# Patient Record
Sex: MALE | Race: BLACK OR AFRICAN AMERICAN | Employment: OTHER | URBAN - METROPOLITAN AREA
[De-identification: names, ages, dates, MRNs, and addresses within clinical notes are randomized per-mention and may not be internally consistent; named-entity substitution may affect disease eponyms.]

---

## 2018-10-18 ENCOUNTER — HOSPITAL ENCOUNTER (OUTPATIENT)
Dept: WOUND CARE | Age: 63
Discharge: HOME | End: 2018-10-18
Payer: OTHER GOVERNMENT

## 2018-10-18 VITALS
WEIGHT: 144 LBS | RESPIRATION RATE: 16 BRPM | DIASTOLIC BLOOD PRESSURE: 88 MMHG | TEMPERATURE: 98 F | SYSTOLIC BLOOD PRESSURE: 130 MMHG | HEART RATE: 74 BPM | HEIGHT: 68 IN | BODY MASS INDEX: 21.82 KG/M2

## 2018-10-18 DIAGNOSIS — Z93.1 GASTROSTOMY IN PLACE (HCC): ICD-10-CM

## 2018-10-18 DIAGNOSIS — M87.30 OSTEORADIONECROSIS (HCC): ICD-10-CM

## 2018-10-18 DIAGNOSIS — K08.89 NON-RESTORABLE TOOTH: ICD-10-CM

## 2018-10-18 DIAGNOSIS — K02.9 DENTAL CARIES: ICD-10-CM

## 2018-10-18 DIAGNOSIS — H61.23 BILATERAL IMPACTED CERUMEN: ICD-10-CM

## 2018-10-18 DIAGNOSIS — R63.4 WEIGHT LOSS, UNINTENTIONAL: ICD-10-CM
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
## 2018-10-18 DIAGNOSIS — E03.9 ACQUIRED HYPOTHYROIDISM: ICD-10-CM

## 2018-10-18 DIAGNOSIS — T66.XXXS LATE EFFECT OF RADIATION: ICD-10-CM

## 2018-10-18 PROCEDURE — 99202 OFFICE O/P NEW SF 15 MIN: CPT

## 2018-10-18 PROCEDURE — 99204 OFFICE O/P NEW MOD 45 MIN: CPT | Performed by: INTERNAL MEDICINE

## 2018-10-18 RX ORDER — ACETAMINOPHEN 160 MG
TABLET,DISINTEGRATING ORAL DAILY
COMMUNITY

## 2018-10-18 RX ORDER — METOPROLOL SUCCINATE 25 MG/1
25 TABLET, EXTENDED RELEASE ORAL DAILY
COMMUNITY

## 2018-10-18 NOTE — PLAN OF CARE
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 He is taken to HBO suite and educated about the treatments per HBO nurse,  Dr. Anna Lara will obtain additional required information and contact patient 10/22/18.

## 2018-10-23 PROBLEM — R13.10 DYSPHAGIA: Status: ACTIVE | Noted: 2018-10-23

## 2018-10-23 PROBLEM — Z93.1 GASTROSTOMY IN PLACE (HCC): Status: ACTIVE | Noted: 2018-10-23

## 2018-10-23 PROBLEM — E78.2 MIXED HYPERLIPIDEMIA: Status: ACTIVE | Noted: 2018-10-23

## 2018-10-23 PROBLEM — K08.89 NON-RESTORABLE TOOTH: Status: ACTIVE | Noted: 2018-10-23

## 2018-10-23 PROBLEM — H61.23 BILATERAL IMPACTED CERUMEN: Status: ACTIVE | Noted: 2018-10-23
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
## 2018-10-23 PROBLEM — K02.9 DENTAL CARIES: Status: ACTIVE | Noted: 2018-10-23

## 2018-10-23 PROBLEM — M87.30 OSTEORADIONECROSIS (HCC): Status: ACTIVE | Noted: 2018-10-23

## 2018-10-23 PROBLEM — T66.XXXS LATE EFFECT OF RADIATION: Status: ACTIVE | Noted: 2018-10-23

## 2018-10-23 PROBLEM — R63.4 WEIGHT LOSS, UNINTENTIONAL: Status: ACTIVE | Noted: 2018-10-23

## 2018-10-23 PROBLEM — E03.9 HYPOTHYROIDISM: Status: ACTIVE | Noted: 2018-10-23

## 2018-10-23 NOTE — CONSULTS
medical history of Head and neck cancer ([facility] [address] and Non-STEMI (non-ST elevated myocardial infarction) ([facility] [address] He has a past surgical history that includes Coronary artery bypass graft (03/2015) and Pharyngeal Biopsy (Left, 2015). His family history includes Heart Disease in his father and mother. Mr. Branden Lara reports that he has never smoked. He has never used smokeless tobacco. He reports that he does not drink alcohol or use drugs. His current medication list consists of Levothyroxine Sodium, Pembrolizumab, SENIOR MULTIVITAMIN PLUS, Vitamin D3, metoprolol succinate, and vitamin D. Allergies: Patient has no known allergies. Pertinent items from the review of systems are discussed in the HPI; the remainder of the ROS was reviewed and is negative. Objective:     Vitals:    10/18/18 1300   BP: 130/88   Pulse: 74   Resp: 16   Temp: 98 °F (36.7 °C)   TempSrc: Oral   Weight: 144 lb (65.3 kg)   Height: 5' 8\" (1.727 m)       Constitutional:  well-developed, thin but not cachectic, fatigued, chronically ill appearing, NAD  Psychiatric:  oriented to person, place and time; mood and affect appropriate for the situation (a bit depressed)  Eyes:  pupils equal, round and reactive to light; sclerae anicteric, conjunctivae not pale  ENT: no thrush or oral ulcers; tooth #2 discolored, carious, a bit loose, tender, with swollen gingiva around it; TMs both obscured by impacted cerumen  Lungs: clear to auscultation B/L  Heart: regular, no murmur, no gallop  Lymphatic:  no cervical or para-auricular adenopathy  _____________________________    CXR (4/12/16) -- prior sternotomy, minimal left sided pleural effusion    TTE (10/19/16) -- estimated EF 50-55%    EKG (5/12/17) -- performed at [facility], but I'm unable to access records in Epic. Most recent labs in July 2016 with albumin 3.7. (No records sent yet from the [place]).     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 Factors contributing to occurrence and/or persistence of the chronic ulcer include really only his prior radiation therapy, and any possible malnutrition and immunosuppression associated with the presence of his cancer; his current immunotherapy isn't immunosuppressive itself, just targets PCD receptors on his lymphocytes, enabling them to better attack his cancer cells. I believe HBOT is indicated as an important adjunctive therapy in this case because of Mr. River's condition, to speed healing and to decrease the chance of serious complications including chronic bone exposure, osteomyelitis and loss of bone. Specifically, the goals of HBOT in this case are to reverse the hypoxia in the maxillary and adjacent soft tissues by decreasing edema and enhancing angiogenesis, to repair injured tissue. Due to the potential adverse effects of HBO therapy, I reviewed some particular aspects of Mr. River's medical history.  There is no history of idiopathic epilepsy, secondary seizures, stroke, CNS surgery or bleeding, recent head trauma, frequent hypoglycemia, untreated cataracts, optic neuritis, bleomycin-associated pulmonary fibrosis or recent bleomycin use, recent pulmonary lobectomy or pneumonectomy, obstructive or

## 2018-11-26 ENCOUNTER — HOSPITAL ENCOUNTER (OUTPATIENT)
Dept: HYPERBARIC MEDICINE | Age: 63
Discharge: HOME | End: 2018-11-26
Payer: OTHER GOVERNMENT

## 2018-11-26 VITALS
DIASTOLIC BLOOD PRESSURE: 71 MMHG | BODY MASS INDEX: 21.82 KG/M2 | HEART RATE: 76 BPM | TEMPERATURE: 98.1 F | WEIGHT: 144 LBS | RESPIRATION RATE: 16 BRPM | SYSTOLIC BLOOD PRESSURE: 124 MMHG | HEIGHT: 68 IN

## 2018-11-26 PROCEDURE — 99212 OFFICE O/P EST SF 10 MIN: CPT

## 2018-11-28 ENCOUNTER — HOSPITAL ENCOUNTER (OUTPATIENT)
Dept: HYPERBARIC MEDICINE | Age: 63
Discharge: HOME | End: 2018-11-28
Payer: OTHER GOVERNMENT

## 2018-11-28 VITALS
SYSTOLIC BLOOD PRESSURE: 134 MMHG | RESPIRATION RATE: 16 BRPM | HEART RATE: 66 BPM | TEMPERATURE: 98.4 F | DIASTOLIC BLOOD PRESSURE: 76 MMHG

## 2018-11-28 PROCEDURE — G0277 HBOT, FULL BODY CHAMBER, 30M: HCPCS

## 2018-11-28 PROCEDURE — 99183 HYPERBARIC OXYGEN THERAPY: CPT | Performed by: EMERGENCY MEDICINE

## 2018-11-29 ENCOUNTER — HOSPITAL ENCOUNTER (OUTPATIENT)
Dept: HYPERBARIC MEDICINE | Age: 63
Discharge: HOME | End: 2018-11-29
Payer: OTHER GOVERNMENT

## 2018-11-29 VITALS
RESPIRATION RATE: 16 BRPM | HEART RATE: 59 BPM | DIASTOLIC BLOOD PRESSURE: 84 MMHG | TEMPERATURE: 97.7 F | SYSTOLIC BLOOD PRESSURE: 145 MMHG

## 2018-11-29 PROCEDURE — G0277 HBOT, FULL BODY CHAMBER, 30M: HCPCS

## 2018-11-30 ENCOUNTER — HOSPITAL ENCOUNTER (OUTPATIENT)
Dept: HYPERBARIC MEDICINE | Age: 63
Discharge: HOME | End: 2018-11-30
Payer: OTHER GOVERNMENT

## 2018-11-30 VITALS
RESPIRATION RATE: 16 BRPM | HEART RATE: 60 BPM | DIASTOLIC BLOOD PRESSURE: 76 MMHG | SYSTOLIC BLOOD PRESSURE: 123 MMHG | TEMPERATURE: 98 F

## 2018-11-30 PROCEDURE — 99183 HYPERBARIC OXYGEN THERAPY: CPT | Performed by: INTERNAL MEDICINE

## 2018-11-30 PROCEDURE — G0277 HBOT, FULL BODY CHAMBER, 30M: HCPCS

## 2018-11-30 NOTE — PLAN OF CARE
REVIEWED: yes   Smoking has a negative effect on treatment and may diminish the benefits you may receive. Smoking within 2 hours prior to your treatment poses additional risk and should be avoided completely. Drinking alcohol or using illicit drugs may also have a negative effect on your treatment and should be avoided. Drinking carbonated beverages such as soda pop within 1 hour of your treatment could cause pains in the stomach during the treatment. Caffeinated beverages or foods containing caffeine should be avoided before your treatment. Please let us know if we can assist you with seeking help to stop smoking, drinking or using recreational drugs. PROHIBITED ITEMS INFORMATION REVIEWED: yes   No wigs, hair spray, hair oils, hair ornaments, creams, lotions, make-up, after shave, perfumes, colognes, chap stick, lip balms, mustache waxes, petroleum products (e.g. Vaseline), baby oil, deodorant or ointments  No nail polish    No synthetic clothing  No nylon hose, underwear, panty hose or bra  No food, gum or candy  No jewelry  No smoking materials such as lighter, cigarettes or matches  No reading material  No hearing aids, non-fixed dentures  No Hard (non-gas permeable) contact lenses  Most dressings are approved to wear into the hyperbaric chamber. Please advise the hyperbaric staff of any new dressings applied to your wound to ensure the new dressings are compatible with hyperbaric oxygen treatments. No alcohol preps  No heat packs  No street clothes or shoes  No cell phones or other electronics  If it was not a part of you when you were born into this world, if it was not provided by the chamber , then it cannot go into the chamber with you.         Electronically signed by Julio César Weaver RN on 11/26/2018 at 7:39 AM

## 2018-12-03 ENCOUNTER — HOSPITAL ENCOUNTER (OUTPATIENT)
Dept: HYPERBARIC MEDICINE | Age: 63
Discharge: HOME | End: 2018-12-03
Payer: OTHER GOVERNMENT

## 2018-12-03 VITALS
RESPIRATION RATE: 16 BRPM | TEMPERATURE: 97.9 F | SYSTOLIC BLOOD PRESSURE: 138 MMHG | WEIGHT: 144 LBS | DIASTOLIC BLOOD PRESSURE: 80 MMHG | HEART RATE: 65 BPM | BODY MASS INDEX: 21.82 KG/M2 | HEIGHT: 68 IN

## 2018-12-03 PROCEDURE — 99183 HYPERBARIC OXYGEN THERAPY: CPT | Performed by: INTERNAL MEDICINE

## 2018-12-03 PROCEDURE — G0277 HBOT, FULL BODY CHAMBER, 30M: HCPCS

## 2018-12-04 ENCOUNTER — HOSPITAL ENCOUNTER (OUTPATIENT)
Dept: HYPERBARIC MEDICINE | Age: 63
Discharge: HOME | End: 2018-12-04
Payer: OTHER GOVERNMENT

## 2018-12-04 VITALS
HEART RATE: 66 BPM | DIASTOLIC BLOOD PRESSURE: 77 MMHG | SYSTOLIC BLOOD PRESSURE: 118 MMHG | HEIGHT: 68 IN | WEIGHT: 144 LBS | BODY MASS INDEX: 21.82 KG/M2 | RESPIRATION RATE: 16 BRPM | TEMPERATURE: 97.9 F

## 2018-12-04 PROCEDURE — G0277 HBOT, FULL BODY CHAMBER, 30M: HCPCS

## 2018-12-04 PROCEDURE — 99183 HYPERBARIC OXYGEN THERAPY: CPT | Performed by: INTERNAL MEDICINE

## 2018-12-05 ENCOUNTER — HOSPITAL ENCOUNTER (OUTPATIENT)
Dept: HYPERBARIC MEDICINE | Age: 63
Discharge: HOME | End: 2018-12-05
Payer: OTHER GOVERNMENT

## 2018-12-05 VITALS
DIASTOLIC BLOOD PRESSURE: 72 MMHG | TEMPERATURE: 97.9 F | HEART RATE: 61 BPM | RESPIRATION RATE: 16 BRPM | SYSTOLIC BLOOD PRESSURE: 122 MMHG

## 2018-12-05 PROCEDURE — 99183 HYPERBARIC OXYGEN THERAPY: CPT | Performed by: INTERNAL MEDICINE

## 2018-12-05 PROCEDURE — G0277 HBOT, FULL BODY CHAMBER, 30M: HCPCS

## 2018-12-07 ENCOUNTER — HOSPITAL ENCOUNTER (OUTPATIENT)
Dept: HYPERBARIC MEDICINE | Age: 63
Discharge: HOME | End: 2018-12-07
Payer: OTHER GOVERNMENT

## 2018-12-07 VITALS
HEART RATE: 63 BPM | DIASTOLIC BLOOD PRESSURE: 78 MMHG | TEMPERATURE: 98 F | RESPIRATION RATE: 16 BRPM | SYSTOLIC BLOOD PRESSURE: 124 MMHG

## 2018-12-07 PROCEDURE — 99183 HYPERBARIC OXYGEN THERAPY: CPT | Performed by: INTERNAL MEDICINE

## 2018-12-07 PROCEDURE — G0277 HBOT, FULL BODY CHAMBER, 30M: HCPCS

## 2018-12-07 NOTE — PLAN OF CARE
Problem: Physical Regulation:  Intervention: Assess knowledge and expectations of HBO therapy  Patient seen for HBOT treatment 7 / 20    today. Patient assessment WNL and cleared for HBOT. Patient was compressed in HBO chamber to 2.5 SHANELL at 2.0 psi/min x 90 min treatment w/ 2 five air breaks @30 min intervals. Patient is tolerating treatment well and is currently resting comfortably and watching television. HBO RN at chamber side, will continue to monitor.

## 2018-12-10 ENCOUNTER — HOSPITAL ENCOUNTER (OUTPATIENT)
Dept: HYPERBARIC MEDICINE | Age: 63
Discharge: HOME | End: 2018-12-10
Payer: OTHER GOVERNMENT

## 2018-12-10 VITALS
RESPIRATION RATE: 16 BRPM | BODY MASS INDEX: 21.82 KG/M2 | HEART RATE: 54 BPM | SYSTOLIC BLOOD PRESSURE: 117 MMHG | DIASTOLIC BLOOD PRESSURE: 78 MMHG | HEIGHT: 68 IN | WEIGHT: 144 LBS | TEMPERATURE: 98 F

## 2018-12-10 PROCEDURE — 99183 HYPERBARIC OXYGEN THERAPY: CPT | Performed by: INTERNAL MEDICINE

## 2018-12-10 PROCEDURE — G0277 HBOT, FULL BODY CHAMBER, 30M: HCPCS

## 2018-12-10 NOTE — PROGRESS NOTES
[address]  Hyperbaric Oxygen    Everet Hodgkin     : 1955    DATE OF VISIT:  2018    Subjective     Everet Hodgkin is a 59 y.o. male who  has a past medical history of Head and neck cancer ([facility] [address] () and Non-STEMI (non-ST elevated myocardial infarction) ([facility] [address] (3/28/2016). He presents to the [facility] today for hyperbaric oxygen treatment of his subclinical jaw osteoradionecrosis, which could lead to significant complications if untreated before dental extractions. Patient denies fever. Patient denies nausea, vomiting or diarrhea; no ear troubles and no other new complaints; no fears or anxiety regarding treatment today. Objective     Vitals:    18 1330 18 1609   BP: 112/73 123/76   Pulse: 76 60   Resp: 16 16   Temp: 98 °F (36.7 °C) 98 °F (36.7 °C)   TempSrc: Oral Oral       General:  Alert, cooperative, no distress. Ears: External otic canals are within acceptable limits. TMs are mostly obscured by impacted cerumen. Lungs:  Clear to auscultation bilaterally. Ulcer: Not examined today in HBO, if applicable. No results for input(s): POCGLU in the last 72 hours. Assessment     Everet Hodgkin is a 59 y.o. male who presented to the [facility] today for hyperbaric oxygen treatment #3 of 30 for the diagnosis as stated above. Treatment given at 2.5 SHANELL for 90 minutes, with two 5-minute air breaks. 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 XXXS    Hypothyroidism E03.9    Bilateral impacted cerumen H61.23       In my clinical judgement, ongoing HBO therapy is necessary at this time, given a threat to patient function, limb or life from the current condition. Adjunctive Rx, objective weekly progress and goals of Rx will generally be updated on Mondays. Plan     1. Hyperbaric Oxygen - Zain River tolerated the treatment well today without complications. Continue HBO treatment as outlined above. 2. Other - continue Debrox BL; will plan to irrigate ears early next week; can continue to dive for now, as long as no symptoms of barotrauma. I was present on these premises and immediately available to furnish assistance & direction throughout the procedure.      -- Electronically signed by Jeimy Alves MD on 12/10/2018 at 9:08 AM

## 2018-12-11 ENCOUNTER — HOSPITAL ENCOUNTER (OUTPATIENT)
Dept: HYPERBARIC MEDICINE | Age: 63
Discharge: HOME | End: 2018-12-11
Payer: OTHER GOVERNMENT

## 2018-12-11 VITALS
SYSTOLIC BLOOD PRESSURE: 134 MMHG | DIASTOLIC BLOOD PRESSURE: 79 MMHG | RESPIRATION RATE: 16 BRPM | TEMPERATURE: 97.6 F | HEART RATE: 63 BPM

## 2018-12-11 DIAGNOSIS — T70.0XXA OTITIC BAROTRAUMA, INITIAL ENCOUNTER: ICD-10-CM

## 2018-12-11 PROCEDURE — G0277 HBOT, FULL BODY CHAMBER, 30M: HCPCS

## 2018-12-11 PROCEDURE — 99183 HYPERBARIC OXYGEN THERAPY: CPT | Performed by: INTERNAL MEDICINE

## 2018-12-12 ENCOUNTER — HOSPITAL ENCOUNTER (OUTPATIENT)
Dept: HYPERBARIC MEDICINE | Age: 63
Discharge: HOME | End: 2018-12-12
Payer: OTHER GOVERNMENT

## 2018-12-12 VITALS
RESPIRATION RATE: 16 BRPM | SYSTOLIC BLOOD PRESSURE: 122 MMHG | HEART RATE: 71 BPM | TEMPERATURE: 98 F | DIASTOLIC BLOOD PRESSURE: 81 MMHG

## 2018-12-12 PROCEDURE — 99183 HYPERBARIC OXYGEN THERAPY: CPT | Performed by: EMERGENCY MEDICINE

## 2018-12-12 PROCEDURE — G0277 HBOT, FULL BODY CHAMBER, 30M: HCPCS

## 2018-12-13 ENCOUNTER — HOSPITAL ENCOUNTER (OUTPATIENT)
Dept: HYPERBARIC MEDICINE | Age: 63
Discharge: HOME | End: 2018-12-13
Payer: OTHER GOVERNMENT

## 2018-12-13 VITALS
HEART RATE: 75 BPM | RESPIRATION RATE: 16 BRPM | SYSTOLIC BLOOD PRESSURE: 112 MMHG | TEMPERATURE: 98.1 F | DIASTOLIC BLOOD PRESSURE: 74 MMHG

## 2018-12-13 PROCEDURE — 99183 HYPERBARIC OXYGEN THERAPY: CPT | Performed by: NURSE PRACTITIONER

## 2018-12-13 PROCEDURE — G0277 HBOT, FULL BODY CHAMBER, 30M: HCPCS

## 2018-12-13 NOTE — PROGRESS NOTES
[address]  Hyperbaric Oxygen    Segun Bunch     : 1955    DATE OF VISIT:  2018    Subjective     Segun Bunch is a 59 y.o. male who  has a past medical history of Head and neck cancer ([facility] [address] () and Non-STEMI (non-ST elevated myocardial infarction) ([facility] [address] (3/28/2016). He presents to the [facility] today for hyperbaric oxygen treatment of his subclinical osteoradionecrosis of the jaw,  which is refractory to standard wound care therapy for 30 days. Patient denies fever. Patient denies nausea, vomiting or diarrhea; no ear troubles and no other new complaints; no fears or anxiety regarding treatment today. Objective     Vitals:    18 1310 18 1532   BP: 110/63 122/81   Pulse: 74 71   Resp: 16 16   Temp: 97.7 °F (36.5 °C) 98 °F (36.7 °C)   TempSrc: Oral Oral       General:  Alert, cooperative, no distress. Ears: External otic canals are within acceptable limits. TMs are well visualized. Teed Grade 3 on the right and 1 on the left pre-treatment. Teed Grade 3 on the right and 1 on the left post-treatment. Lungs:  Clear to auscultation bilaterally. Ulcer: Not examined today in HBO, if applicable. No results for input(s): POCGLU in the last 72 hours. Assessment     Segun Bunch is a 59 y.o. male who presented to the [facility] today for hyperbaric oxygen treatment #11 of 20 for the diagnosis as stated above. Treatment given at 2.5 SHANELL for 90 minutes, two 5-minute  air breaks.     Patient Active Problem List   Diagnosis Code    3-vessel coronary artery disease I25.10    Essential hypertension I10    Mixed hyperlipidemia E78.2    Nasopharynx cancer ([facility] [address] C11.9    Dysphagia R13.10    Gastrostomy in place (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 Adjunctive Rx, objective weekly progress and goals of Rx will generally be updated on Mondays. Plan     1. Hyperbaric Oxygen - Zain River tolerated the treatment well today without complications. Continue HBO treatment as outlined above. 2. Other - pt with barotrauma to ears bilaterally and was offered ENT referral for PE tubes which he declines at this time. I was present on these premises and immediately available to furnish assistance & direction throughout the procedure.      -- Electronically signed by ANAM Greco CNP on 12/13/2018 at 12:56 PM

## 2018-12-13 NOTE — PROGRESS NOTES
[address]  Hyperbaric Oxygen    Maddie Duncan     : 1955    DATE OF VISIT:  2018    Subjective     Maddie Duncan is a 59 y.o. male who  has a past medical history of Head and neck cancer ([facility] [address] () and Non-STEMI (non-ST elevated myocardial infarction) ([facility] [address] (3/28/2016). He presents to the [facility] today for hyperbaric oxygen treatment of his subclinical osteoradionecrosis of the jaw, which is refractory to standard wound care therapy for 30 days. Patient denies fever. Patient denies nausea, vomiting or diarrhea; no ear troubles and no other new complaints; no fears or anxiety regarding treatment today. Objective     There were no vitals filed for this visit. General:  Alert, cooperative, no distress. Ears: External otic canals are within acceptable limits. TMs are well visualized. Teed Grade 3 on the right and 1 on the left pre-treatment. Teed Grade 3 on the right and 1 on the left post-treatment. Lungs:  Clear to auscultation bilaterally. Ulcer: Not examined today in HBO, if applicable. No results for input(s): POCGLU in the last 72 hours. Assessment     Maddie Duncan is a 59 y.o. male who presented to the [facility] today for hyperbaric oxygen treatment #12 of 20 for the diagnosis as stated above. Treatment given at 2.5 SHANELL for 90 minutes, two five minute air breaks. 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 XXXS    Hypothyroidism E03.9    Bilateral impacted cerumen H61.23       In my clinical judgement, ongoing HBO therapy is necessary at this time, given a threat to patient function, limb or life from the current condition. Adjunctive Rx, objective weekly progress and goals of Rx will generally be updated on Mondays. Plan     1. Hyperbaric Oxygen - Zain River tolerated the treatment well today without complications. Continue HBO treatment as outlined above. 2. Other -     I was present on these premises and immediately available to furnish assistance & direction throughout the procedure.      -- Electronically signed by ANAM Hadley CNP on 12/13/2018 at 12:54 PM

## 2018-12-14 ENCOUNTER — HOSPITAL ENCOUNTER (OUTPATIENT)
Dept: HYPERBARIC MEDICINE | Age: 63
Discharge: HOME | End: 2018-12-14
Payer: OTHER GOVERNMENT

## 2018-12-14 VITALS
HEART RATE: 69 BPM | TEMPERATURE: 98.6 F | RESPIRATION RATE: 16 BRPM | DIASTOLIC BLOOD PRESSURE: 62 MMHG | SYSTOLIC BLOOD PRESSURE: 138 MMHG

## 2018-12-14 PROCEDURE — 99183 HYPERBARIC OXYGEN THERAPY: CPT | Performed by: INTERNAL MEDICINE

## 2018-12-14 PROCEDURE — G0277 HBOT, FULL BODY CHAMBER, 30M: HCPCS

## 2018-12-15 PROBLEM — H61.22 IMPACTED CERUMEN OF LEFT EAR: Status: ACTIVE | Noted: 2018-10-23

## 2018-12-15 NOTE — PROGRESS NOTES
[address]  Hyperbaric Oxygen    Dada Ray     : 1955    DATE OF VISIT:  2018    Subjective     Dada Ray is a 59 y.o. male who  has a past medical history of Head and neck cancer ([facility] [address] (); History of hyperbaric oxygen therapy (2018); and Non-STEMI (non-ST elevated myocardial infarction) ([facility] [address] (3/28/2016). He presents to the [facility] today for hyperbaric oxygen treatment of his subclinical osteoradionecrosis of the jaw, which is a risk for severe complications if he has dental extractions without supportive HBOT. Patient denies fever. Patient denies nausea, vomiting or diarrhea; no ear troubles and no other new complaints; no fears or anxiety regarding treatment today. Objective     Vitals:    18 1145 18 1416   BP: 104/71 124/78   Pulse: 70 63   Resp: 16 16   Temp: 97.9 °F (36.6 °C) 98 °F (36.7 °C)   TempSrc: Oral Oral       General:  Alert, cooperative, no distress. Ears: External otic canals are within acceptable limits. Right TM is well visualized, left partly obscured. Teed Grade 0 on the right and 0 on the left pre-treatment. Teed Grade 0 on the right and 0 on the left post-treatment. Lungs:  Clear to auscultation bilaterally. Ulcer: Not examined today in HBO, if applicable. No results for input(s): POCGLU in the last 72 hours. Assessment     Dada Ray is a 59 y.o. male who presented to the [facility] today for hyperbaric oxygen treatment #7 of 30 for the diagnosis as stated above. Treatment given at 2.5 SHANELL for 90 minutes, with two 5- minute air breaks.     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 XXXS    Hypothyroidism E03.9    Impacted cerumen of left ear H61.22       In my clinical judgement, ongoing HBO therapy is necessary at this time, given a threat to patient function, limb or life from the current condition. Adjunctive Rx, objective weekly progress and goals of Rx will generally be updated on Mondays. Plan     1. Hyperbaric Oxygen - Zain River tolerated the treatment well today without complications. Continue HBO treatment as outlined above. 2. Other -     I was present on these premises and immediately available to furnish assistance & direction throughout the procedure.      -- Electronically signed by Brenda Espinal MD on 12/15/2018 at 4:30 PM

## 2018-12-16 PROBLEM — M27.2 OSTEORADIONECROSIS OF JAW: Status: ACTIVE | Noted: 2018-10-23

## 2018-12-16 PROBLEM — T70.0XXA OTIC BAROTRAUMA: Status: ACTIVE | Noted: 2018-12-16

## 2018-12-16 NOTE — PROGRESS NOTES
[address]  Hyperbaric Oxygen    Amber Salmeron     : 1955    DATE OF VISIT:  2018    Subjective     Amber Salmeron is a 59 y.o. male who  has a past medical history of Head and neck cancer ([facility] [address] (); History of hyperbaric oxygen therapy (2018); and Non-STEMI (non-ST elevated myocardial infarction) ([facility] [address] (3/28/2016). He presents to the [facility] today for hyperbaric oxygen treatment of his subclinical osteoradionecrosis of the jaw, which is a risk for developing complications after dental extractions, if he's not treated with supportive HBOT. Patient denies fever. Patient denies nausea, vomiting or diarrhea; no ear troubles and no other new complaints; no fears or anxiety regarding treatment today. Objective     Vitals:    18 1315 18 1537   BP: 112/74 134/79   Pulse: 75 63   Resp: 16 16   Temp: 98.1 °F (36.7 °C) 97.6 °F (36.4 °C)   TempSrc: Oral Oral       General:  Alert, cooperative, no distress. Ears: External otic canals are within acceptable limits. TMs are well visualized. Teed Grade 1 on the right and 0 on the left pre-treatment. Teed Grade 2 on the right and 1 on the left post-treatment. Lungs:  Clear to auscultation bilaterally. Ulcer: Not examined today in HBO, if applicable. No results for input(s): POCGLU in the last 72 hours. Assessment     Amber Salmeron is a 59 y.o. male who presented to the [facility] today for hyperbaric oxygen treatment #9 of 30 for the diagnosis as stated above. Treatment given at 2.5 SHANELL for 90 minutes, with two 5-minute air breaks.     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

## 2018-12-16 NOTE — PROGRESS NOTES
stating that he had seen the ENT doc at the [place] before and didn't feel like they did anything to help him. We'll cautiously proceed with HBOT for now, and obviously attend to his ears closely every day. I was present on these premises and immediately available to furnish assistance & direction throughout the procedure.      -- Electronically signed by Ibis Rdz MD on 12/16/2018 at 10:11 AM

## 2019-01-04 ENCOUNTER — HOSPITAL ENCOUNTER (OUTPATIENT)
Dept: HYPERBARIC MEDICINE | Age: 64
Discharge: HOME | End: 2019-01-04
Payer: OTHER GOVERNMENT

## 2019-01-04 PROCEDURE — G0277 HBOT, FULL BODY CHAMBER, 30M: HCPCS

## 2019-01-09 ENCOUNTER — HOSPITAL ENCOUNTER (OUTPATIENT)
Dept: HYPERBARIC MEDICINE | Age: 64
Discharge: HOME | End: 2019-01-09
Payer: OTHER GOVERNMENT

## 2019-01-09 VITALS
DIASTOLIC BLOOD PRESSURE: 81 MMHG | HEART RATE: 57 BPM | RESPIRATION RATE: 16 BRPM | SYSTOLIC BLOOD PRESSURE: 141 MMHG | TEMPERATURE: 98 F

## 2019-01-09 PROCEDURE — 99183 HYPERBARIC OXYGEN THERAPY: CPT | Performed by: EMERGENCY MEDICINE

## 2019-01-09 PROCEDURE — G0277 HBOT, FULL BODY CHAMBER, 30M: HCPCS

## 2019-01-09 RX ORDER — ASCORBIC ACID 500 MG
500 TABLET ORAL DAILY
COMMUNITY

## 2019-01-10 ENCOUNTER — HOSPITAL ENCOUNTER (OUTPATIENT)
Dept: HYPERBARIC MEDICINE | Age: 64
Discharge: HOME | End: 2019-01-10
Payer: OTHER GOVERNMENT

## 2019-01-10 VITALS
TEMPERATURE: 98.1 F | HEART RATE: 59 BPM | RESPIRATION RATE: 16 BRPM | DIASTOLIC BLOOD PRESSURE: 83 MMHG | SYSTOLIC BLOOD PRESSURE: 133 MMHG

## 2019-01-10 PROCEDURE — G0277 HBOT, FULL BODY CHAMBER, 30M: HCPCS

## 2019-01-10 PROCEDURE — 99183 HYPERBARIC OXYGEN THERAPY: CPT | Performed by: INTERNAL MEDICINE

## 2019-01-11 ENCOUNTER — HOSPITAL ENCOUNTER (OUTPATIENT)
Dept: HYPERBARIC MEDICINE | Age: 64
Discharge: HOME | End: 2019-01-11
Payer: OTHER GOVERNMENT

## 2019-01-11 VITALS
TEMPERATURE: 97.8 F | DIASTOLIC BLOOD PRESSURE: 81 MMHG | RESPIRATION RATE: 16 BRPM | HEART RATE: 61 BPM | SYSTOLIC BLOOD PRESSURE: 129 MMHG

## 2019-01-11 PROCEDURE — G0277 HBOT, FULL BODY CHAMBER, 30M: HCPCS

## 2019-01-11 PROCEDURE — 99183 HYPERBARIC OXYGEN THERAPY: CPT | Performed by: INTERNAL MEDICINE

## 2019-01-14 ENCOUNTER — HOSPITAL ENCOUNTER (OUTPATIENT)
Dept: HYPERBARIC MEDICINE | Age: 64
Discharge: HOME | End: 2019-01-14
Payer: OTHER GOVERNMENT

## 2019-01-14 VITALS
SYSTOLIC BLOOD PRESSURE: 123 MMHG | RESPIRATION RATE: 18 BRPM | HEART RATE: 65 BPM | DIASTOLIC BLOOD PRESSURE: 74 MMHG | TEMPERATURE: 97.9 F

## 2019-01-14 PROBLEM — H61.22 IMPACTED CERUMEN OF LEFT EAR: Status: RESOLVED | Noted: 2018-10-23 | Resolved: 2019-01-14

## 2019-01-14 PROCEDURE — G0277 HBOT, FULL BODY CHAMBER, 30M: HCPCS

## 2019-01-14 PROCEDURE — 99183 HYPERBARIC OXYGEN THERAPY: CPT | Performed by: INTERNAL MEDICINE

## 2019-01-15 ENCOUNTER — HOSPITAL ENCOUNTER (OUTPATIENT)
Dept: HYPERBARIC MEDICINE | Age: 64
Discharge: HOME | End: 2019-01-15
Payer: OTHER GOVERNMENT

## 2019-01-15 VITALS
RESPIRATION RATE: 16 BRPM | TEMPERATURE: 97.8 F | SYSTOLIC BLOOD PRESSURE: 127 MMHG | HEART RATE: 59 BPM | DIASTOLIC BLOOD PRESSURE: 79 MMHG

## 2019-01-15 PROCEDURE — 99183 HYPERBARIC OXYGEN THERAPY: CPT | Performed by: INTERNAL MEDICINE

## 2019-01-15 PROCEDURE — G0277 HBOT, FULL BODY CHAMBER, 30M: HCPCS

## 2019-01-16 ENCOUNTER — HOSPITAL ENCOUNTER (OUTPATIENT)
Dept: HYPERBARIC MEDICINE | Age: 64
Discharge: HOME | End: 2019-01-16
Payer: OTHER GOVERNMENT

## 2019-01-16 VITALS
TEMPERATURE: 98.2 F | SYSTOLIC BLOOD PRESSURE: 133 MMHG | RESPIRATION RATE: 16 BRPM | HEART RATE: 57 BPM | DIASTOLIC BLOOD PRESSURE: 80 MMHG

## 2019-01-16 PROCEDURE — 99183 HYPERBARIC OXYGEN THERAPY: CPT | Performed by: INTERNAL MEDICINE

## 2019-01-16 PROCEDURE — G0277 HBOT, FULL BODY CHAMBER, 30M: HCPCS

## 2019-01-16 RX ORDER — OXYMETAZOLINE HYDROCHLORIDE 0.05 G/100ML
2 SPRAY NASAL PRN
COMMUNITY

## 2019-01-17 ENCOUNTER — HOSPITAL ENCOUNTER (OUTPATIENT)
Dept: HYPERBARIC MEDICINE | Age: 64
Discharge: HOME | End: 2019-01-17
Payer: OTHER GOVERNMENT

## 2019-01-17 VITALS
DIASTOLIC BLOOD PRESSURE: 84 MMHG | SYSTOLIC BLOOD PRESSURE: 126 MMHG | HEART RATE: 67 BPM | RESPIRATION RATE: 16 BRPM | TEMPERATURE: 98.1 F

## 2019-01-17 PROCEDURE — G0277 HBOT, FULL BODY CHAMBER, 30M: HCPCS

## 2019-01-17 PROCEDURE — 99183 HYPERBARIC OXYGEN THERAPY: CPT | Performed by: NURSE PRACTITIONER

## 2019-01-18 ENCOUNTER — HOSPITAL ENCOUNTER (OUTPATIENT)
Dept: HYPERBARIC MEDICINE | Age: 64
Discharge: HOME | End: 2019-01-18
Payer: OTHER GOVERNMENT

## 2019-01-18 VITALS
SYSTOLIC BLOOD PRESSURE: 111 MMHG | TEMPERATURE: 98 F | DIASTOLIC BLOOD PRESSURE: 68 MMHG | RESPIRATION RATE: 16 BRPM | HEART RATE: 67 BPM

## 2019-01-18 PROCEDURE — 99183 HYPERBARIC OXYGEN THERAPY: CPT | Performed by: INTERNAL MEDICINE

## 2019-01-18 PROCEDURE — G0277 HBOT, FULL BODY CHAMBER, 30M: HCPCS

## 2019-01-23 ENCOUNTER — HOSPITAL ENCOUNTER (OUTPATIENT)
Dept: HYPERBARIC MEDICINE | Age: 64
Discharge: HOME | End: 2019-01-23
Payer: OTHER GOVERNMENT

## 2019-01-23 VITALS
DIASTOLIC BLOOD PRESSURE: 70 MMHG | TEMPERATURE: 98 F | HEART RATE: 82 BPM | RESPIRATION RATE: 16 BRPM | SYSTOLIC BLOOD PRESSURE: 120 MMHG

## 2019-01-23 PROCEDURE — 99183 HYPERBARIC OXYGEN THERAPY: CPT | Performed by: INTERNAL MEDICINE

## 2019-01-23 PROCEDURE — G0277 HBOT, FULL BODY CHAMBER, 30M: HCPCS

## 2019-01-24 ENCOUNTER — HOSPITAL ENCOUNTER (OUTPATIENT)
Dept: HYPERBARIC MEDICINE | Age: 64
Discharge: HOME | End: 2019-01-24
Payer: OTHER GOVERNMENT

## 2019-01-24 VITALS
RESPIRATION RATE: 16 BRPM | SYSTOLIC BLOOD PRESSURE: 128 MMHG | DIASTOLIC BLOOD PRESSURE: 77 MMHG | TEMPERATURE: 98.3 F | HEART RATE: 60 BPM

## 2019-01-24 PROCEDURE — G0277 HBOT, FULL BODY CHAMBER, 30M: HCPCS

## 2019-01-24 PROCEDURE — 99183 HYPERBARIC OXYGEN THERAPY: CPT | Performed by: NURSE PRACTITIONER

## 2019-01-24 RX ORDER — ACETAMINOPHEN 500 MG
1000 TABLET ORAL 3 TIMES DAILY PRN
COMMUNITY

## 2019-01-24 RX ORDER — AMOXICILLIN 250 MG/1
250 CAPSULE ORAL 3 TIMES DAILY
COMMUNITY
End: 2019-01-31 | Stop reason: ALTCHOICE

## 2019-01-24 ASSESSMENT — PAIN DESCRIPTION - ORIENTATION: ORIENTATION: RIGHT

## 2019-01-24 ASSESSMENT — PAIN DESCRIPTION - DESCRIPTORS: DESCRIPTORS: ACHING

## 2019-01-24 ASSESSMENT — PAIN SCALES - GENERAL: PAINLEVEL_OUTOF10: 1

## 2019-01-24 ASSESSMENT — PAIN DESCRIPTION - LOCATION: LOCATION: JAW

## 2019-01-24 ASSESSMENT — PAIN DESCRIPTION - FREQUENCY: FREQUENCY: INTERMITTENT

## 2019-01-24 ASSESSMENT — PAIN DESCRIPTION - PAIN TYPE: TYPE: ACUTE PAIN

## 2019-01-24 ASSESSMENT — PAIN DESCRIPTION - PROGRESSION: CLINICAL_PROGRESSION: NOT CHANGED

## 2019-01-24 ASSESSMENT — PAIN DESCRIPTION - ONSET: ONSET: SUDDEN

## 2019-01-25 ENCOUNTER — HOSPITAL ENCOUNTER (OUTPATIENT)
Dept: HYPERBARIC MEDICINE | Age: 64
Discharge: HOME | End: 2019-01-25
Payer: OTHER GOVERNMENT

## 2019-01-25 VITALS
HEART RATE: 56 BPM | SYSTOLIC BLOOD PRESSURE: 132 MMHG | RESPIRATION RATE: 16 BRPM | DIASTOLIC BLOOD PRESSURE: 77 MMHG | TEMPERATURE: 97.2 F

## 2019-01-25 PROCEDURE — 99183 HYPERBARIC OXYGEN THERAPY: CPT | Performed by: INTERNAL MEDICINE

## 2019-01-25 PROCEDURE — G0277 HBOT, FULL BODY CHAMBER, 30M: HCPCS

## 2019-01-28 ENCOUNTER — HOSPITAL ENCOUNTER (OUTPATIENT)
Dept: HYPERBARIC MEDICINE | Age: 64
Discharge: HOME | End: 2019-01-28
Payer: OTHER GOVERNMENT

## 2019-01-28 VITALS
RESPIRATION RATE: 16 BRPM | TEMPERATURE: 97.9 F | DIASTOLIC BLOOD PRESSURE: 74 MMHG | SYSTOLIC BLOOD PRESSURE: 123 MMHG | HEART RATE: 54 BPM

## 2019-01-28 PROCEDURE — G0277 HBOT, FULL BODY CHAMBER, 30M: HCPCS

## 2019-01-28 PROCEDURE — 99183 HYPERBARIC OXYGEN THERAPY: CPT | Performed by: INTERNAL MEDICINE

## 2019-01-28 ASSESSMENT — PAIN SCALES - GENERAL: PAINLEVEL_OUTOF10: 0

## 2019-01-31 ENCOUNTER — HOSPITAL ENCOUNTER (OUTPATIENT)
Dept: HYPERBARIC MEDICINE | Age: 64
Discharge: HOME | End: 2019-01-31
Payer: OTHER GOVERNMENT

## 2019-01-31 VITALS
TEMPERATURE: 98 F | HEART RATE: 56 BPM | DIASTOLIC BLOOD PRESSURE: 76 MMHG | SYSTOLIC BLOOD PRESSURE: 130 MMHG | RESPIRATION RATE: 16 BRPM

## 2019-01-31 PROCEDURE — 99183 HYPERBARIC OXYGEN THERAPY: CPT | Performed by: NURSE PRACTITIONER

## 2019-01-31 PROCEDURE — G0277 HBOT, FULL BODY CHAMBER, 30M: HCPCS

## 2019-02-01 ENCOUNTER — HOSPITAL ENCOUNTER (OUTPATIENT)
Dept: HYPERBARIC MEDICINE | Age: 64
Discharge: HOME | End: 2019-02-01
Payer: OTHER GOVERNMENT

## 2019-02-01 VITALS
RESPIRATION RATE: 16 BRPM | DIASTOLIC BLOOD PRESSURE: 73 MMHG | HEART RATE: 63 BPM | SYSTOLIC BLOOD PRESSURE: 122 MMHG | TEMPERATURE: 97.9 F

## 2019-02-01 PROCEDURE — 99183 HYPERBARIC OXYGEN THERAPY: CPT | Performed by: INTERNAL MEDICINE

## 2019-02-01 PROCEDURE — G0277 HBOT, FULL BODY CHAMBER, 30M: HCPCS

## 2019-02-04 ENCOUNTER — HOSPITAL ENCOUNTER (OUTPATIENT)
Dept: HYPERBARIC MEDICINE | Age: 64
Discharge: HOME | End: 2019-02-04
Payer: OTHER GOVERNMENT

## 2019-02-04 VITALS
DIASTOLIC BLOOD PRESSURE: 73 MMHG | TEMPERATURE: 97.8 F | RESPIRATION RATE: 16 BRPM | HEART RATE: 57 BPM | SYSTOLIC BLOOD PRESSURE: 123 MMHG

## 2019-02-04 PROCEDURE — G0277 HBOT, FULL BODY CHAMBER, 30M: HCPCS

## 2019-02-04 PROCEDURE — 99183 HYPERBARIC OXYGEN THERAPY: CPT | Performed by: INTERNAL MEDICINE

## 2019-02-04 ASSESSMENT — PAIN SCALES - GENERAL
PAINLEVEL_OUTOF10: 0
PAINLEVEL_OUTOF10: 0

## 2019-02-05 ENCOUNTER — HOSPITAL ENCOUNTER (OUTPATIENT)
Dept: HYPERBARIC MEDICINE | Age: 64
Discharge: HOME | End: 2019-02-05
Payer: OTHER GOVERNMENT

## 2019-02-05 VITALS
RESPIRATION RATE: 18 BRPM | SYSTOLIC BLOOD PRESSURE: 116 MMHG | DIASTOLIC BLOOD PRESSURE: 73 MMHG | HEART RATE: 62 BPM | TEMPERATURE: 97.9 F

## 2019-02-05 PROCEDURE — 99183 HYPERBARIC OXYGEN THERAPY: CPT | Performed by: INTERNAL MEDICINE

## 2019-02-05 PROCEDURE — G0277 HBOT, FULL BODY CHAMBER, 30M: HCPCS

## 2019-02-06 ENCOUNTER — HOSPITAL ENCOUNTER (OUTPATIENT)
Dept: HYPERBARIC MEDICINE | Age: 64
Discharge: HOME | End: 2019-02-06
Payer: OTHER GOVERNMENT

## 2019-02-06 VITALS
RESPIRATION RATE: 16 BRPM | HEART RATE: 63 BPM | DIASTOLIC BLOOD PRESSURE: 76 MMHG | TEMPERATURE: 98.6 F | SYSTOLIC BLOOD PRESSURE: 127 MMHG

## 2019-02-06 PROCEDURE — 99183 HYPERBARIC OXYGEN THERAPY: CPT | Performed by: EMERGENCY MEDICINE

## 2019-02-06 PROCEDURE — G0277 HBOT, FULL BODY CHAMBER, 30M: HCPCS

## 2019-02-11 PROBLEM — K02.9 DENTAL CARIES: Status: RESOLVED | Noted: 2018-10-23 | Resolved: 2019-02-11

## 2020-11-21 NOTE — PLAN OF CARE
Problem: Physical Regulation:  Intervention: Assess knowledge and expectations of HBO therapy  Patient seen for HBOT treatment   1/20 today. Patient assessment WNL and cleared for HBOT. Patient was compressed in HBO chamber to 2.5 SHANELL at 1.0 psi/min. Pt monitored and encourged for ear equalizing. Patient is tolerating treatment well and is currently resting comfortably and watching television. HBO RN at chamber side, will continue to monitor.
Patient tolerated procedure well.

## 2022-12-14 NOTE — PLAN OF CARE
Problem: Physical Regulation:  Intervention: Assess knowledge and expectations of HBO therapy  Pt to the [address] [place] [address] for HBOT. Assessment completed and pt cleared for HBOT. Pt complained of his right eustachian tube felt clogged. Dr Jamil Tate aware and assessed ears. Pt used afrin nasal spray. Instructed patient to clear ears often during compression and to notify nurse of any pain in ears or trouble clearing ears. Pt to 2.5 SHANELL at a rate of 2.0 psi. Pt denies any ear pain when at pressure. Pt watching tv. Nurse at chamber side and will continue to monitor.
No